# Patient Record
Sex: FEMALE | Employment: OTHER | URBAN - METROPOLITAN AREA
[De-identification: names, ages, dates, MRNs, and addresses within clinical notes are randomized per-mention and may not be internally consistent; named-entity substitution may affect disease eponyms.]

---

## 2018-01-22 DIAGNOSIS — Z01.89 ROUTINE LAB DRAW: Primary | ICD-10-CM

## 2018-01-25 DIAGNOSIS — Z01.89 ROUTINE LAB DRAW: Primary | ICD-10-CM

## 2018-01-29 DIAGNOSIS — T84.52XA PROSTHETIC JOINT INFECTION OF LEFT HIP (H): Primary | ICD-10-CM

## 2021-07-05 ENCOUNTER — TELEPHONE (OUTPATIENT)
Dept: ORTHOPEDICS | Facility: CLINIC | Age: 36
End: 2021-07-05

## 2021-07-05 NOTE — TELEPHONE ENCOUNTER
----- Message from Baylee Canas LPN sent at 7/5/2021  9:30 AM CDT -----  Regarding: Schedule next available with Dr. Juárez.  Please schedule at next available with Dr. Juárez for 2nd opinion, left hip pain and clicking,     Hx of surgery. Referred by Dr. Vinny Coughlin, [id number]     Thank you,     [place]

## 2021-07-07 NOTE — TELEPHONE ENCOUNTER
RECORDS RECEIVED FROM: Dr. Juárez for 2nd opinion, left hip pain and clicking,   Hx of surgery. Referred by Dr. Vinny Coughlin, Rheumatology at [facility].    DATE RECEIVED: Aug 2, 2021     NOTES STATUS DETAILS   OFFICE NOTE from referring provider Care Everywhere    OFFICE NOTE from other specialist N/A    DISCHARGE SUMMARY from hospital N/A    DISCHARGE REPORT from the ER N/A    OPERATIVE REPORT Care Everywhere 1/4/18 Kaitlin Arellano MD    1/2/18 Kaitlin Arellano MD    6/14/17 Tyree Rios MD     MEDICATION LIST Internal    IMPLANT RECORD/STICKER Care Everywhere    LABS     CBC/DIFF N/A    CULTURES N/A    INJECTIONS DONE IN RADIOLOGY N/A    MRI In process    CT SCAN In process    XRAYS (IMAGES & REPORTS) In process    TUMOR     PATHOLOGY  Slides & report N/A    07/27/21   10:23 AM   IMAGES RESOLVED IN PACS  COMPLETE  Christina Russo CMA    07/07/21   1:43 PM   FAXED REQUEST TO [facility] [place] [phone]   Christina Russo CMA

## 2021-07-28 DIAGNOSIS — M25.552 LEFT HIP PAIN: Primary | ICD-10-CM

## 2021-08-02 ENCOUNTER — PRE VISIT (OUTPATIENT)
Dept: ORTHOPEDICS | Facility: CLINIC | Age: 36
End: 2021-08-02